# Patient Record
(demographics unavailable — no encounter records)

---

## 2025-02-26 NOTE — PHYSICAL EXAM
[de-identified] : b copious cerumen impaction - could not tolerate gentle attempt at removal [Midline] : trachea located in midline position [Laryngoscopy Performed] : laryngoscopy was performed, see procedure section for findings [Normal] : no rashes [de-identified] : gait steady

## 2025-02-26 NOTE — PROCEDURE
[Dysphagia] : dysphagia not clearly evaluated by indirect laryngoscopy [Topical Lidocaine] : topical lidocaine [Oxymetazoline HCl] : oxymetazoline HCl [Flexible Endoscope] : examined with the flexible endoscope [Serial Number: ___] : Serial Number: [unfilled] [Normal] : posterior cricoid area had healthy pink mucosa in the interarytenoid area and the esophageal inlet [de-identified] : done for cough and dysphagia lingual tonsillitis [de-identified] : lingual tonsillitis

## 2025-02-26 NOTE — HISTORY OF PRESENT ILLNESS
[de-identified] : 23 yo m [id number] had a lot of coughing- went to urgent care and prescribed benzonatate- saw a PA and was then prescribed allegra and no better. Weekend prior he had gone out with friends and smoked but does not usually smoke. Denies allergies. Said his lungs were examined and was told they were clear. Said he had no abx and the cough was not productive and was tested for viruses and negative - He feels something in his lower anterior neck which triggers it/fullness when he swallows. Did not cough in our office. Denies allergy. -f/s/c no fh or other sh relevant to cc.

## 2025-02-26 NOTE — ASSESSMENT
[FreeTextEntry1] : lingual tonsillitis causing dysphagia and cough- possible augmentin rec ct - he prefers to try abx first rtc 1 week - no better>>ct  cerumen impaction - debrox will reattempt removal once softened with debrox

## 2025-02-26 NOTE — PHYSICAL EXAM
[de-identified] : b copious cerumen impaction - could not tolerate gentle attempt at removal [Midline] : trachea located in midline position [Laryngoscopy Performed] : laryngoscopy was performed, see procedure section for findings [Normal] : no rashes [de-identified] : gait steady

## 2025-02-26 NOTE — PROCEDURE
[Dysphagia] : dysphagia not clearly evaluated by indirect laryngoscopy [Topical Lidocaine] : topical lidocaine [Oxymetazoline HCl] : oxymetazoline HCl [Flexible Endoscope] : examined with the flexible endoscope [Serial Number: ___] : Serial Number: [unfilled] [Normal] : posterior cricoid area had healthy pink mucosa in the interarytenoid area and the esophageal inlet [de-identified] : done for cough and dysphagia lingual tonsillitis [de-identified] : lingual tonsillitis

## 2025-02-26 NOTE — HISTORY OF PRESENT ILLNESS
[de-identified] : 23 yo m [id number] had a lot of coughing- went to urgent care and prescribed benzonatate- saw a PA and was then prescribed allegra and no better. Weekend prior he had gone out with friends and smoked but does not usually smoke. Denies allergies. Said his lungs were examined and was told they were clear. Said he had no abx and the cough was not productive and was tested for viruses and negative - He feels something in his lower anterior neck which triggers it/fullness when he swallows. Did not cough in our office. Denies allergy. -f/s/c no fh or other sh relevant to cc.

## 2025-03-12 NOTE — HISTORY OF PRESENT ILLNESS
[de-identified] : 2 week followup visit for this 25 y/o M for dysphagia and cough. At last visit, found to have evidence of lingual tonsillitis, recommended Augmentin. He has mild discomfort if he stretches his neck or plays tennis. He still feels fullness in throat - if takes large breath in am has need to cough and stiil feels like something in in his throat. Of note, he also has developed nasal congestion and drainage for a week. -f/s/c

## 2025-03-12 NOTE — PHYSICAL EXAM
[de-identified] : b cerumen impaction- he does not want removed - has not tried drops yet [Nasal Endoscopy Performed] : nasal endoscopy was performed, see procedure section for findings [de-identified] : engorged [Normal] : mucosa is normal [Midline] : trachea located in midline position [de-identified] : gait steady

## 2025-03-12 NOTE — PROCEDURE
[Posterior Lesion] : posterior lesion [Anterior rhinoscopy insufficient to account for symptoms] : anterior rhinoscopy insufficient to account for symptoms [Topical Lidocaine] : topical lidocaine [Oxymetazoline HCl] : oxymetazoline HCl [Flexible Endoscope] : examined with the flexible endoscope [Serial Number: ___] : Serial Number: [unfilled] [Congested] : congested [] : hypertrophy present bilateral [Normal] : the paranasal sinuses had no abnormalities [de-identified] : done for nasal congestion and drainage advanced to see larynx - nl; resolved lingual tonsillitis [FreeTextEntry6] : done for nasal congestion and drainage sinusitis

## 2025-03-12 NOTE — HISTORY OF PRESENT ILLNESS
[de-identified] : 2 week followup visit for this 23 y/o M for dysphagia and cough. At last visit, found to have evidence of lingual tonsillitis, recommended Augmentin. He has mild discomfort if he stretches his neck or plays tennis. He still feels fullness in throat - if takes large breath in am has need to cough and stiil feels like something in in his throat. Of note, he also has developed nasal congestion and drainage for a week. -f/s/c

## 2025-03-12 NOTE — PROCEDURE
[Posterior Lesion] : posterior lesion [Anterior rhinoscopy insufficient to account for symptoms] : anterior rhinoscopy insufficient to account for symptoms [Topical Lidocaine] : topical lidocaine [Oxymetazoline HCl] : oxymetazoline HCl [Flexible Endoscope] : examined with the flexible endoscope [Serial Number: ___] : Serial Number: [unfilled] [Congested] : congested [] : hypertrophy present bilateral [Normal] : the paranasal sinuses had no abnormalities [de-identified] : done for nasal congestion and drainage advanced to see larynx - nl; resolved lingual tonsillitis [FreeTextEntry6] : done for nasal congestion and drainage sinusitis

## 2025-03-12 NOTE — ASSESSMENT
[FreeTextEntry1] : sinusitis flonase zpack persistent dysphagia despite medical mgmt rtc with neck mri

## 2025-03-12 NOTE — PHYSICAL EXAM
[de-identified] : b cerumen impaction- he does not want removed - has not tried drops yet [Nasal Endoscopy Performed] : nasal endoscopy was performed, see procedure section for findings [de-identified] : engorged [Normal] : mucosa is normal [Midline] : trachea located in midline position [de-identified] : gait steady

## 2025-03-12 NOTE — PHYSICAL EXAM
[de-identified] : b cerumen impaction- he does not want removed - has not tried drops yet [Nasal Endoscopy Performed] : nasal endoscopy was performed, see procedure section for findings [de-identified] : engorged [Normal] : mucosa is normal [Midline] : trachea located in midline position [de-identified] : gait steady

## 2025-03-12 NOTE — HISTORY OF PRESENT ILLNESS
[de-identified] : 2 week followup visit for this 23 y/o M for dysphagia and cough. At last visit, found to have evidence of lingual tonsillitis, recommended Augmentin. He has mild discomfort if he stretches his neck or plays tennis. He still feels fullness in throat - if takes large breath in am has need to cough and stiil feels like something in in his throat. Of note, he also has developed nasal congestion and drainage for a week. -f/s/c

## 2025-03-12 NOTE — PROCEDURE
[Posterior Lesion] : posterior lesion [Anterior rhinoscopy insufficient to account for symptoms] : anterior rhinoscopy insufficient to account for symptoms [Topical Lidocaine] : topical lidocaine [Oxymetazoline HCl] : oxymetazoline HCl [Flexible Endoscope] : examined with the flexible endoscope [Serial Number: ___] : Serial Number: [unfilled] [Congested] : congested [] : hypertrophy present bilateral [Normal] : the paranasal sinuses had no abnormalities [de-identified] : done for nasal congestion and drainage advanced to see larynx - nl; resolved lingual tonsillitis [FreeTextEntry6] : done for nasal congestion and drainage sinusitis